# Patient Record
Sex: FEMALE | Race: WHITE | URBAN - METROPOLITAN AREA
[De-identification: names, ages, dates, MRNs, and addresses within clinical notes are randomized per-mention and may not be internally consistent; named-entity substitution may affect disease eponyms.]

---

## 2017-01-09 RX ORDER — DAPSONE 50 MG/G
GEL TOPICAL
Qty: 60 G | Refills: 0 | Status: SHIPPED | OUTPATIENT
Start: 2017-01-09 | End: 2017-03-03 | Stop reason: SDUPTHER

## 2017-01-20 ENCOUNTER — OFFICE VISIT (OUTPATIENT)
Dept: FAMILY MEDICINE CLINIC | Age: 22
End: 2017-01-20

## 2017-01-20 VITALS
RESPIRATION RATE: 16 BRPM | DIASTOLIC BLOOD PRESSURE: 74 MMHG | SYSTOLIC BLOOD PRESSURE: 114 MMHG | WEIGHT: 124 LBS | HEART RATE: 93 BPM | OXYGEN SATURATION: 98 % | HEIGHT: 63 IN | BODY MASS INDEX: 21.97 KG/M2 | TEMPERATURE: 97.6 F

## 2017-01-20 DIAGNOSIS — Z01.419 VISIT FOR GYNECOLOGIC EXAMINATION: ICD-10-CM

## 2017-01-20 DIAGNOSIS — Z00.00 WELL ADULT EXAM: Primary | ICD-10-CM

## 2017-01-20 PROCEDURE — 99395 PREV VISIT EST AGE 18-39: CPT | Performed by: FAMILY MEDICINE

## 2017-01-20 RX ORDER — LEVONORGESTREL / ETHINYL ESTRADIOL AND ETHINYL ESTRADIOL 150-30(84)
KIT ORAL
Qty: 91 TABLET | Refills: 3 | Status: SHIPPED | OUTPATIENT
Start: 2017-01-20

## 2017-01-20 ASSESSMENT — ENCOUNTER SYMPTOMS
NAUSEA: 0
SHORTNESS OF BREATH: 0
CHEST TIGHTNESS: 0
TROUBLE SWALLOWING: 0
WHEEZING: 0
EYE REDNESS: 0
RHINORRHEA: 0
EYE ITCHING: 0
ABDOMINAL PAIN: 0
VOMITING: 0

## 2017-03-06 RX ORDER — DAPSONE 50 MG/G
GEL TOPICAL
Qty: 60 G | Refills: 0 | Status: SHIPPED | OUTPATIENT
Start: 2017-03-06 | End: 2017-04-19 | Stop reason: SDUPTHER

## 2017-04-19 ENCOUNTER — OFFICE VISIT (OUTPATIENT)
Dept: FAMILY MEDICINE CLINIC | Age: 22
End: 2017-04-19

## 2017-04-19 VITALS
SYSTOLIC BLOOD PRESSURE: 118 MMHG | HEART RATE: 94 BPM | HEIGHT: 63 IN | BODY MASS INDEX: 22.15 KG/M2 | OXYGEN SATURATION: 99 % | DIASTOLIC BLOOD PRESSURE: 68 MMHG | WEIGHT: 125 LBS | TEMPERATURE: 97.1 F | RESPIRATION RATE: 16 BRPM

## 2017-04-19 DIAGNOSIS — N92.6 ABNORMAL MENSES: Primary | ICD-10-CM

## 2017-04-19 PROCEDURE — 99213 OFFICE O/P EST LOW 20 MIN: CPT | Performed by: FAMILY MEDICINE

## 2017-04-19 RX ORDER — DAPSONE 50 MG/G
GEL TOPICAL
Qty: 60 G | Refills: 0 | Status: SHIPPED | OUTPATIENT
Start: 2017-04-19 | End: 2017-06-16 | Stop reason: SDUPTHER

## 2017-04-19 RX ORDER — LEVONORGESTREL / ETHINYL ESTRADIOL AND ETHINYL ESTRADIOL 150-30(84)
KIT ORAL
Qty: 91 TABLET | Refills: 3 | Status: SHIPPED | OUTPATIENT
Start: 2017-04-19 | End: 2018-02-14 | Stop reason: SDUPTHER

## 2017-04-19 ASSESSMENT — ENCOUNTER SYMPTOMS
ABDOMINAL PAIN: 0
NAUSEA: 0
VISUAL CHANGE: 0
BACK PAIN: 0

## 2017-06-13 RX ORDER — DAPSONE 50 MG/G
GEL TOPICAL
Qty: 60 G | Refills: 0 | OUTPATIENT
Start: 2017-06-13

## 2017-06-14 ENCOUNTER — TELEPHONE (OUTPATIENT)
Dept: DERMATOLOGY | Age: 22
End: 2017-06-14

## 2017-06-16 ENCOUNTER — TELEPHONE (OUTPATIENT)
Dept: FAMILY MEDICINE CLINIC | Age: 22
End: 2017-06-16

## 2017-06-16 RX ORDER — DAPSONE 50 MG/G
GEL TOPICAL
Qty: 60 G | Refills: 0 | Status: SHIPPED | OUTPATIENT
Start: 2017-06-16 | End: 2017-07-17 | Stop reason: SDUPTHER

## 2017-07-18 RX ORDER — DAPSONE 50 MG/G
GEL TOPICAL
Qty: 60 G | Refills: 2 | Status: SHIPPED | OUTPATIENT
Start: 2017-07-18 | End: 2017-12-01 | Stop reason: SDUPTHER

## 2017-12-01 RX ORDER — DAPSONE 50 MG/G
GEL TOPICAL
Qty: 60 G | Refills: 2 | Status: SHIPPED | OUTPATIENT
Start: 2017-12-01 | End: 2018-03-15 | Stop reason: SDUPTHER

## 2018-02-14 ENCOUNTER — OFFICE VISIT (OUTPATIENT)
Dept: FAMILY MEDICINE CLINIC | Age: 23
End: 2018-02-14

## 2018-02-14 VITALS
HEART RATE: 99 BPM | TEMPERATURE: 96.5 F | BODY MASS INDEX: 21.97 KG/M2 | OXYGEN SATURATION: 99 % | RESPIRATION RATE: 16 BRPM | WEIGHT: 124 LBS | SYSTOLIC BLOOD PRESSURE: 130 MMHG | DIASTOLIC BLOOD PRESSURE: 82 MMHG | HEIGHT: 63 IN

## 2018-02-14 DIAGNOSIS — G47.00 INSOMNIA, UNSPECIFIED TYPE: ICD-10-CM

## 2018-02-14 DIAGNOSIS — R68.89 COLD INTOLERANCE: Primary | ICD-10-CM

## 2018-02-14 DIAGNOSIS — R53.82 CHRONIC FATIGUE: ICD-10-CM

## 2018-02-14 LAB
ANION GAP SERPL CALCULATED.3IONS-SCNC: 14 MMOL/L (ref 3–16)
BASOPHILS ABSOLUTE: 0 K/UL (ref 0–0.2)
BASOPHILS RELATIVE PERCENT: 0.5 %
BUN BLDV-MCNC: 11 MG/DL (ref 7–20)
CALCIUM SERPL-MCNC: 10.1 MG/DL (ref 8.3–10.6)
CHLORIDE BLD-SCNC: 102 MMOL/L (ref 99–110)
CO2: 25 MMOL/L (ref 21–32)
CREAT SERPL-MCNC: 0.7 MG/DL (ref 0.6–1.1)
EOSINOPHILS ABSOLUTE: 0.2 K/UL (ref 0–0.6)
EOSINOPHILS RELATIVE PERCENT: 2.1 %
GFR AFRICAN AMERICAN: >60
GFR NON-AFRICAN AMERICAN: >60
GLUCOSE BLD-MCNC: 84 MG/DL (ref 70–99)
HCT VFR BLD CALC: 43.9 % (ref 36–48)
HEMOGLOBIN: 14.9 G/DL (ref 12–16)
LYMPHOCYTES ABSOLUTE: 3 K/UL (ref 1–5.1)
LYMPHOCYTES RELATIVE PERCENT: 38.8 %
MCH RBC QN AUTO: 29.9 PG (ref 26–34)
MCHC RBC AUTO-ENTMCNC: 33.9 G/DL (ref 31–36)
MCV RBC AUTO: 88.2 FL (ref 80–100)
MONOCYTES ABSOLUTE: 0.6 K/UL (ref 0–1.3)
MONOCYTES RELATIVE PERCENT: 7.8 %
NEUTROPHILS ABSOLUTE: 3.9 K/UL (ref 1.7–7.7)
NEUTROPHILS RELATIVE PERCENT: 50.8 %
PDW BLD-RTO: 12.3 % (ref 12.4–15.4)
PLATELET # BLD: 297 K/UL (ref 135–450)
PMV BLD AUTO: 9.5 FL (ref 5–10.5)
POTASSIUM SERPL-SCNC: 4.1 MMOL/L (ref 3.5–5.1)
RBC # BLD: 4.98 M/UL (ref 4–5.2)
SODIUM BLD-SCNC: 141 MMOL/L (ref 136–145)
T4 FREE: 1.4 NG/DL (ref 0.9–1.8)
TSH SERPL DL<=0.05 MIU/L-ACNC: 2.33 UIU/ML (ref 0.27–4.2)
WBC # BLD: 7.6 K/UL (ref 4–11)

## 2018-02-14 PROCEDURE — 99214 OFFICE O/P EST MOD 30 MIN: CPT | Performed by: FAMILY MEDICINE

## 2018-02-14 RX ORDER — LEVONORGESTREL / ETHINYL ESTRADIOL AND ETHINYL ESTRADIOL 150-30(84)
KIT ORAL
Qty: 91 TABLET | Refills: 3 | Status: SHIPPED | OUTPATIENT
Start: 2018-02-14

## 2018-02-14 ASSESSMENT — ENCOUNTER SYMPTOMS
ABDOMINAL PAIN: 0
SORE THROAT: 0
EYE DISCHARGE: 0
VISUAL CHANGE: 0
VOMITING: 0
CHANGE IN BOWEL HABIT: 0
NAUSEA: 0
COUGH: 0
SWOLLEN GLANDS: 0

## 2018-02-14 NOTE — PROGRESS NOTES
Subjective:      Patient ID: Anne Dunlap is a 25 y.o. female. Other   This is a chronic (cold intolerence) problem. The current episode started more than 1 month ago. The problem occurs constantly. The problem has been unchanged. Associated symptoms include chills and fatigue. Pertinent negatives include no abdominal pain, anorexia, arthralgias, change in bowel habit, chest pain, congestion, coughing, diaphoresis, fever, headaches, joint swelling, myalgias, nausea, neck pain, numbness, rash, sore throat, swollen glands, vertigo, visual change, vomiting or weakness. Associated symptoms comments: Insomnia \"can't fall asleep because I'm cold\". Nothing aggravates the symptoms. She has tried nothing for the symptoms. Review of Systems   Constitutional: Positive for chills and fatigue. Negative for diaphoresis and fever. HENT: Negative for congestion and sore throat. Eyes: Negative for discharge. Respiratory: Negative for cough. Cardiovascular: Negative for chest pain. Gastrointestinal: Negative for abdominal pain, anorexia, change in bowel habit, nausea and vomiting. Endocrine: Positive for cold intolerance. Negative for heat intolerance. Musculoskeletal: Negative for arthralgias, joint swelling, myalgias and neck pain. Skin: Negative for rash. Neurological: Negative for dizziness, vertigo, weakness, light-headedness, numbness and headaches. Psychiatric/Behavioral: Positive for sleep disturbance. The patient is not nervous/anxious. has No Known Allergies. Current Outpatient Prescriptions:     Levonorgest-Eth Estrad 91-Day (SEASONIQUE) 0.15-0.03 &0.01 MG TABS, One po qd, Disp: 91 tablet, Rfl: 3    ACZONE 5 % GEL, APPLY TOPICALLY TO THE FACE TWICE A DAY FOR ACNE, Disp: 60 g, Rfl: 2    CAMRESE 0.15-0.03 &0.01 MG TABS, Take one po qd, Disp: 91 tablet, Rfl: 3     has a past medical history of GERD (gastroesophageal reflux disease).     Past Surgical History:   Procedure Laterality TSH without Reflex    T4, FREE    CBC Auto Differential   3. Insomnia, unspecified type             Plan:      1. Her exam is wnl  Check labs    2. Get fu labs    3.  Sleep hygiene, may try melatonin prn     Fu 1 mo with results

## 2018-02-16 ENCOUNTER — TELEPHONE (OUTPATIENT)
Dept: FAMILY MEDICINE CLINIC | Age: 23
End: 2018-02-16

## 2018-02-21 ENCOUNTER — OFFICE VISIT (OUTPATIENT)
Dept: FAMILY MEDICINE CLINIC | Age: 23
End: 2018-02-21

## 2018-02-21 VITALS
HEART RATE: 84 BPM | SYSTOLIC BLOOD PRESSURE: 120 MMHG | TEMPERATURE: 99 F | WEIGHT: 128 LBS | BODY MASS INDEX: 22.68 KG/M2 | HEIGHT: 63 IN | OXYGEN SATURATION: 99 % | DIASTOLIC BLOOD PRESSURE: 84 MMHG | RESPIRATION RATE: 16 BRPM

## 2018-02-21 DIAGNOSIS — Z01.419 ENCOUNTER FOR GYNECOLOGICAL EXAMINATION WITHOUT ABNORMAL FINDING: Primary | ICD-10-CM

## 2018-02-21 DIAGNOSIS — E55.9 VITAMIN D INSUFFICIENCY: ICD-10-CM

## 2018-02-21 DIAGNOSIS — Z23 NEED FOR VACCINATION: ICD-10-CM

## 2018-02-21 LAB — VITAMIN D 25-HYDROXY: 59.3 NG/ML

## 2018-02-21 PROCEDURE — 90715 TDAP VACCINE 7 YRS/> IM: CPT | Performed by: FAMILY MEDICINE

## 2018-02-21 PROCEDURE — 99395 PREV VISIT EST AGE 18-39: CPT | Performed by: FAMILY MEDICINE

## 2018-02-21 PROCEDURE — 90471 IMMUNIZATION ADMIN: CPT | Performed by: FAMILY MEDICINE

## 2018-02-21 ASSESSMENT — ENCOUNTER SYMPTOMS
WHEEZING: 0
EYE REDNESS: 0
EYE ITCHING: 0
TROUBLE SWALLOWING: 0
RHINORRHEA: 0
SHORTNESS OF BREATH: 0
CHEST TIGHTNESS: 0
NAUSEA: 0
ABDOMINAL PAIN: 0
VOMITING: 0

## 2018-02-21 NOTE — PROGRESS NOTES
present. No thyromegaly present. Cardiovascular: Normal rate, regular rhythm, normal heart sounds and intact distal pulses. Exam reveals no gallop and no friction rub. No murmur heard. Pulmonary/Chest: Effort normal and breath sounds normal. No stridor. No respiratory distress. She has no wheezes. She has no rales. Chest wall is not dull to percussion. She exhibits no mass, no tenderness, no bony tenderness, no laceration, no crepitus, no edema, no deformity, no swelling and no retraction. Right breast exhibits no inverted nipple, no mass, no nipple discharge, no skin change and no tenderness. Left breast exhibits no inverted nipple, no mass, no nipple discharge, no skin change and no tenderness. Breasts are symmetrical.   Abdominal: Soft. Bowel sounds are normal. She exhibits no distension and no mass. There is no tenderness. There is no rebound and no guarding. Hernia confirmed negative in the right inguinal area and confirmed negative in the left inguinal area. Genitourinary: Vagina normal and uterus normal. No labial fusion. There is no rash, tenderness, lesion or injury on the right labia. There is no rash, tenderness, lesion or injury on the left labia. Uterus is not deviated, not enlarged, not fixed and not tender. Cervix exhibits no motion tenderness, no discharge and no friability. Right adnexum displays no mass, no tenderness and no fullness. Left adnexum displays no mass, no tenderness and no fullness. No erythema, tenderness or bleeding in the vagina. No foreign body in the vagina. No signs of injury around the vagina. No vaginal discharge found. Musculoskeletal: She exhibits no edema or tenderness. Lymphadenopathy:     She has no cervical adenopathy. Right: No inguinal adenopathy present. Left: No inguinal adenopathy present. Neurological: She is alert and oriented to person, place, and time. She displays normal reflexes. No cranial nerve deficit.  She exhibits normal muscle

## 2018-02-22 ENCOUNTER — TELEPHONE (OUTPATIENT)
Dept: FAMILY MEDICINE CLINIC | Age: 23
End: 2018-02-22

## 2018-02-22 LAB
HIV AG/AB: NORMAL
HIV ANTIGEN: NORMAL
HIV-1 ANTIBODY: NORMAL
HIV-2 AB: NORMAL

## 2018-02-23 LAB
C. TRACHOMATIS DNA,THIN PREP: NEGATIVE
N. GONORRHOEAE DNA, THIN PREP: NEGATIVE

## 2018-03-16 RX ORDER — DAPSONE 50 MG/G
GEL TOPICAL
Qty: 60 G | Refills: 2 | Status: SHIPPED | OUTPATIENT
Start: 2018-03-16 | End: 2018-07-17 | Stop reason: SDUPTHER

## 2018-04-04 ENCOUNTER — OFFICE VISIT (OUTPATIENT)
Dept: FAMILY MEDICINE CLINIC | Age: 23
End: 2018-04-04

## 2018-04-04 VITALS
DIASTOLIC BLOOD PRESSURE: 78 MMHG | OXYGEN SATURATION: 98 % | BODY MASS INDEX: 22.68 KG/M2 | SYSTOLIC BLOOD PRESSURE: 122 MMHG | HEART RATE: 85 BPM | RESPIRATION RATE: 16 BRPM | HEIGHT: 63 IN | TEMPERATURE: 97.6 F | WEIGHT: 128 LBS

## 2018-04-04 DIAGNOSIS — J40 BRONCHITIS: Primary | ICD-10-CM

## 2018-04-04 PROCEDURE — 99214 OFFICE O/P EST MOD 30 MIN: CPT | Performed by: FAMILY MEDICINE

## 2018-04-04 RX ORDER — AZITHROMYCIN 250 MG/1
TABLET, FILM COATED ORAL
Qty: 1 PACKET | Refills: 0 | Status: SHIPPED | OUTPATIENT
Start: 2018-04-04 | End: 2018-04-14

## 2018-04-04 RX ORDER — BENZONATATE 100 MG/1
100 CAPSULE ORAL 3 TIMES DAILY PRN
Qty: 21 CAPSULE | Refills: 0 | Status: SHIPPED | OUTPATIENT
Start: 2018-04-04 | End: 2018-04-11

## 2018-04-05 ASSESSMENT — ENCOUNTER SYMPTOMS
RHINORRHEA: 0
WHEEZING: 0
CHEST TIGHTNESS: 0
HEARTBURN: 0
STRIDOR: 0
NAUSEA: 0
EYE ITCHING: 0
HEMOPTYSIS: 0
VOMITING: 0
EYE DISCHARGE: 0
SORE THROAT: 0
DIARRHEA: 0
SHORTNESS OF BREATH: 1
BACK PAIN: 0
COUGH: 1

## 2018-07-19 RX ORDER — DAPSONE 50 MG/G
GEL TOPICAL
Qty: 60 G | Refills: 2 | Status: SHIPPED | OUTPATIENT
Start: 2018-07-19 | End: 2018-11-04 | Stop reason: SDUPTHER

## 2018-09-27 PROBLEM — Z01.419 VISIT FOR GYNECOLOGIC EXAMINATION: Status: RESOLVED | Noted: 2017-01-20 | Resolved: 2018-09-27

## 2018-11-05 RX ORDER — DAPSONE 50 MG/G
GEL TOPICAL
Qty: 60 G | Refills: 2 | Status: SHIPPED | OUTPATIENT
Start: 2018-11-05 | End: 2019-04-24 | Stop reason: SDUPTHER

## 2019-02-28 ENCOUNTER — TELEPHONE (OUTPATIENT)
Dept: FAMILY MEDICINE CLINIC | Age: 24
End: 2019-02-28

## 2019-03-15 RX ORDER — LEVONORGESTREL AND ETHINYL ESTRADIOL 0.15-0.03
KIT ORAL
Qty: 91 TABLET | Refills: 3 | Status: SHIPPED | OUTPATIENT
Start: 2019-03-15

## 2019-04-24 RX ORDER — DAPSONE 50 MG/G
GEL TOPICAL
Qty: 60 G | Refills: 2 | Status: SHIPPED | OUTPATIENT
Start: 2019-04-24 | End: 2019-04-27 | Stop reason: SDUPTHER

## 2019-04-29 RX ORDER — DAPSONE 50 MG/G
GEL TOPICAL
Qty: 60 G | Refills: 0 | Status: SHIPPED | OUTPATIENT
Start: 2019-04-29

## 2019-09-19 ENCOUNTER — TELEPHONE (OUTPATIENT)
Dept: FAMILY MEDICINE CLINIC | Age: 24
End: 2019-09-19

## 2019-09-19 RX ORDER — DAPSONE 50 MG/G
GEL TOPICAL
Qty: 60 G | Refills: 0 | OUTPATIENT
Start: 2019-09-19

## 2019-09-27 RX ORDER — DAPSONE 50 MG/G
GEL TOPICAL
Qty: 90 G | Refills: 0 | OUTPATIENT
Start: 2019-09-27

## 2019-11-14 ENCOUNTER — TELEPHONE (OUTPATIENT)
Dept: OTHER | Facility: OUTPATIENT CENTER | Age: 24
End: 2019-11-14

## 2019-11-14 NOTE — TELEPHONE ENCOUNTER
Telephone Intake--REST  Date: 2019  Client Name: Alexa    MRN: [id number]  : 1995     Age: 24  Caller Name: Self      Presenting Problem / Reason for Assessment   (Clinical History &Symptoms):     Little to no sexual desire.  Has not been sexually active recently.  Looking to start a new relationship  She states that her decreased desire seemed to co inside with starting oral contraceptives 2-3 yrs ago       Length of time experiencing symptoms: 2 - 3 yrs      Seen Other Providers for Gender (if so, where):    M.D/other.(hormones) : [facility]  --If yes, request records from the provider at the 1st session.    Therapist:  N/A  --if yes, request a referral or summary letter from the therapist at the 1st session..    Psychiatrist: N/A  --if yes,, request records from the provider at the 1st session..      Other Medical/Mental Health Diagnoses:  Female Sexual Dysfunction      Medications:  Birth Control        Primary Care Provider:  No  --If multiple medical conditions, request recent records from primary doctor at the 1st session..      Referral Source:       Follow Up:        Please Verify Registration    If patient has GlySure or Chatty, send to .     Prep Welcome Packet and have patient come half hour beforehand to fill out forms in packet (health history form, transgender history, Safety Screening, PHQ9, and PERFECTO-7.       Paperwork sent   Appt

## 2020-01-07 ENCOUNTER — OFFICE VISIT (OUTPATIENT)
Dept: OTHER | Facility: OUTPATIENT CENTER | Age: 25
End: 2020-01-07
Payer: COMMERCIAL

## 2020-01-07 DIAGNOSIS — R68.82 LOW LIBIDO: ICD-10-CM

## 2020-01-07 DIAGNOSIS — F41.9 ANXIETY: ICD-10-CM

## 2020-02-11 PROBLEM — F41.9 ANXIETY: Status: ACTIVE | Noted: 2020-02-11

## 2020-02-11 PROBLEM — R68.82 LOW LIBIDO: Status: ACTIVE | Noted: 2020-02-11

## 2020-02-11 NOTE — PROGRESS NOTES
SEXUAL MEDICINE EVALUATION      IDENTIFICATION:  A 24-year-old female.      CHIEF CONCERN:  Decreased libido x1-2 years.      HISTORY OF PRESENT ILLNESS:  The patient first started her current relationship about 4 years ago with a male partner.  She wanted to wait on engaging in intercourse, and she is currently with the same partner.  She has had no prior sexual partners.  They engaged in non-intercourse sexual activity, touching and foreplay.  No oral sex.  No other penetrative type of sexual activities.  At this time she felt sexual thoughts, feelings and interest.  She felt pleasure from their sexual activity and enjoyment.  Approximately 2 years into their relationship, she felt comfortable enough to engage in intercourse.  He was her first sexual partner.  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  Had experienced test anxiety in college.  Started her first job.  She notes that she continues to get very nervous when quizzed or questioned.  Feels a low level of baseline anxiety.  Prior to all of this, school had been going less well for her as she moved into her first job.  Around this time, also, they had delayed intercourse because she did not want to engage in something that she did not want to do at the time.  There was some relationship stress over it.        After beginning her sexual relationship that involved intercourse, she noticed a gradual decrease in her libido.  Initially, it was difficult to notice, but now over time has become very obvious over the last 2 years.  She describes now few or no sexual thoughts or feelings.  It is better if she appears to be having some breakthrough bleeding on her birth control pills.  It is better when she is not feeling anxious or stressed.  It is better when she has been reading or engaging in some romantic or sexually related book or movie.  There is no change with vacations, no change with more intimacy or time with partner.  She does not drink.  They  still engage in some kissing.  She will sometimes try masturbation.  She achieves some subjective pleasure from this.  Feels longer to get to any objective arousal.  She is able to reach orgasm.  She experiences no pain with sexual activity.      She started birth control pills for acne on her face about 3-1/2 years ago.  She initially started monthly cyclical birth control pills and then switched to every 12 week continuous contraception.  Last fall changed her version from Seasonale or Seasonique to a monophasic pill taken continuously with a period every 12 weeks.  Prior to using birth control pills, her periods were regular with mild to moderate flow and no significant cramping.      The patient denies any history of sexual abuse or violence.      CURRENT MEDICAL PROBLEMS:  Acne as noted above, anxiety.      ALLERGIES:  No known drug allergies.      MEDICATIONS:     1.  Dapsone 5% gel to face.     2.  Vitamin D.     3.  Multivitamin    4.  Birth control pills as noted above.  Currently on Loestrin 1/20 with iron.      PAST MEDICAL HISTORY:  No hospitalizations or surgeries.      FAMILY HISTORY:  Mother with diabetes, type 1.  Father with cardiac valve disease, depression, stroke, obesity.  Maternal grandmother with type 2 diabetes.  Great aunt with breast cancer, nongenetic, as she was tested.  Maternal aunt with leukemia.  Maternal uncle with heart disease.  Maternal grandfather with MI.  Paternal grandfather with MI.  Paternal grandmother with breast cancer.      SOCIAL HISTORY:  The patient eats a standard diet, including dairy.  Has never smoked.  Alcohol once a month, 1 per sitting.  No recreational substances.  Is active 3-4 times a week with a barre class.  Works as a .  No children.      SEXUAL HISTORY:  Has had 1 partner in the lifetime, male partner.  Is hepatitis B vaccinated, HPV vaccinated.  No history of STIs.  Paps have been normal, last one in 2019.  HIV tested negative.       REVIEW OF SYSTEMS:  Notable for nausea with anxiety, occasional neck stiffness and notable anxiety as discussed above.  PHQ-9 is 6, notable for sleep disturbance and lack of energy.  Gad7 is 9.        PHYSICAL EXAMINATION:   GENERAL:  The patient is alert, in no apparent distress.   VITAL SIGNS:  Height 5 feet 3 inches, blood pressure 138/92, pulse 84, weight 138.3.     MENTAL STATUS:  Speech regular rate and rhythm.  Mood appears quite anxious with mild psychomotor agitation.  Is somewhat tearful during the interview.  No suicidal thoughts or ideation apparent.  Affect congruent with mood.     HEENT:  Pupils equal and reactive to light.   SKIN:  No lesions or rashes.   NECK:  Supple, no thyroid enlargement, no carotid bruits, no adenopathy.   HEART:  S1 and S2, no murmurs.   LUNGS:  Clear to auscultation bilaterally.   EXTREMITIES:  Positive pedal pulses bilaterally.  No edema.        LABORATORY:  Reviewed previous labs by previous providers in 12/2019.  TSH, vitamin D and complete blood count were all normal.      ASSESSMENT:   1.  Low libido.   2.  Anxiety disorder, NOS.        PLAN:     1.  Discussed the nature and treatment of low libido, including biological, psychological and social/life factors.  Physiologic components to the patient's change in desire may be related to oral contraceptive pills.  The patient has no other chronic medical problems or chronic medications or acute medical changes that could be affecting her libido at this point.  Would recommend a trial off birth control pills as long as she is not experiencing any significant anxiety regarding pregnancy, and her acne remains reasonably controlled on dapsone gel during a 3 month trial off.  Psychological components include significant anxiety noted above.  Discussed options for managing anxiety, including ongoing therapy as well as medication interventions.  The patient declines all medications, all SSRIs or SNRIs.  Discussed  non-alternative medications, including buspirone and propranolol.  The patient will consider these options.  Life and communication events:  Discussed the communication and events surrounding the beginning of sexual intercourse, anxiety going through engineering school and starting her first job as well as other stress on their relationship over sexual issues.  Recommend sex therapy as well as general therapy for addressing sexual communication and adapting to any sexual concerns or desire discrepancy that may exist between them.     2.  Discussed other medical interventions that may be available for treating low desire, including Wellbutrin, Addyi and Vyleesi.  The patient is not a good candidate for Wellbutrin therapy due to high levels of anxiety.  Discussed flibanserin/Addyi and Vyleesi as therapeutic options, their risks and benefits.  The patient declines both of these.  To follow up as needed if interested in any of the interventions discussed above and again recommended sex therapy and continuing general therapy.       .

## 2020-03-12 ASSESSMENT — ANXIETY QUESTIONNAIRES
7. FEELING AFRAID AS IF SOMETHING AWFUL MIGHT HAPPEN: NOT AT ALL
GAD7 TOTAL SCORE: 7
6. BECOMING EASILY ANNOYED OR IRRITABLE: NOT AT ALL
2. NOT BEING ABLE TO STOP OR CONTROL WORRYING: SEVERAL DAYS
5. BEING SO RESTLESS THAT IT IS HARD TO SIT STILL: NOT AT ALL
1. FEELING NERVOUS, ANXIOUS, OR ON EDGE: MORE THAN HALF THE DAYS
3. WORRYING TOO MUCH ABOUT DIFFERENT THINGS: MORE THAN HALF THE DAYS

## 2020-03-12 ASSESSMENT — PATIENT HEALTH QUESTIONNAIRE - PHQ9
5. POOR APPETITE OR OVEREATING: MORE THAN HALF THE DAYS
SUM OF ALL RESPONSES TO PHQ QUESTIONS 1-9: 6

## 2020-03-13 ASSESSMENT — ANXIETY QUESTIONNAIRES: GAD7 TOTAL SCORE: 7

## 2023-09-01 ENCOUNTER — TRANSFERRED RECORDS (OUTPATIENT)
Dept: HEALTH INFORMATION MANAGEMENT | Facility: CLINIC | Age: 28
End: 2023-09-01

## 2023-09-01 LAB
CHOLESTEROL (EXTERNAL): 170 MG/DL
HDLC SERPL-MCNC: 53 MG/DL
LDL CHOLESTEROL CALCULATED (EXTERNAL): 91 MG/DL
NON HDL CHOLESTEROL (EXTERNAL): 117 MG/DL
TRIGLYCERIDES (EXTERNAL): 151 MG/DL

## 2023-09-05 ENCOUNTER — TRANSCRIBE ORDERS (OUTPATIENT)
Dept: OTHER | Age: 28
End: 2023-09-05

## 2023-09-05 DIAGNOSIS — Z82.49 FAMILY HISTORY OF ISCHEMIC HEART DISEASE: Primary | ICD-10-CM

## 2023-10-09 NOTE — TELEPHONE ENCOUNTER
RECORDS RECEIVED FROM:    DATE RECEIVED:    NOTES STATUS DETAILS   OFFICE NOTE from referring provider  In process    OFFICE NOTE from other cardiologists  N/A    RECORDS from hospital/ED N/A    MEDICATION LIST Internal    GENERAL CARDIO RECORDS   (ALL APPOINTMENT TYPES)     LABS (CBC,BMP,CMP, TSH) Care Everywhere    EKG (STRIPS & REPORTS) N/A    MONITORS (STRIPS & REPORTS) N/A    ECHOS (IMAGES AND REPORTS) N/A    STRESS TESTS (IMAGES AND REPORTS) N/A    cMRI (IMAGES AND REPORTS) N/A    CT/CTA (IMAGES AND REPORTS) N/A      Action 10/9/23 [facility]  Fax #[phone]   Action Taken Requested office notes from referral    Sent note and cholesterol labs to scanning 10/18

## 2023-10-29 ENCOUNTER — HEALTH MAINTENANCE LETTER (OUTPATIENT)
Age: 28
End: 2023-10-29

## 2023-10-30 ENCOUNTER — OFFICE VISIT (OUTPATIENT)
Dept: CARDIOLOGY | Facility: CLINIC | Age: 28
End: 2023-10-30
Attending: NURSE PRACTITIONER
Payer: COMMERCIAL

## 2023-10-30 ENCOUNTER — PRE VISIT (OUTPATIENT)
Dept: CARDIOLOGY | Facility: CLINIC | Age: 28
End: 2023-10-30
Payer: COMMERCIAL

## 2023-10-30 VITALS
HEART RATE: 80 BPM | WEIGHT: 157 LBS | DIASTOLIC BLOOD PRESSURE: 81 MMHG | OXYGEN SATURATION: 98 % | BODY MASS INDEX: 27.82 KG/M2 | HEIGHT: 63 IN | SYSTOLIC BLOOD PRESSURE: 119 MMHG

## 2023-10-30 DIAGNOSIS — Z82.79 FAMILY HISTORY OF BICUSPID AORTIC VALVE: Primary | ICD-10-CM

## 2023-10-30 DIAGNOSIS — R53.83 OTHER FATIGUE: ICD-10-CM

## 2023-10-30 PROCEDURE — 99203 OFFICE O/P NEW LOW 30 MIN: CPT | Performed by: INTERNAL MEDICINE

## 2023-10-30 PROCEDURE — 99213 OFFICE O/P EST LOW 20 MIN: CPT | Performed by: INTERNAL MEDICINE

## 2023-10-30 RX ORDER — LORATADINE 10 MG/1
TABLET ORAL
COMMUNITY

## 2023-10-30 RX ORDER — DAPSONE 50 MG/G
1 GEL TOPICAL
COMMUNITY
Start: 2023-08-29

## 2023-10-30 RX ORDER — DAPSONE 75 MG/G
GEL TOPICAL DAILY
COMMUNITY

## 2023-10-30 RX ORDER — CHOLECALCIFEROL (VITAMIN D3) 125 MCG
CAPSULE ORAL
COMMUNITY

## 2023-10-30 RX ORDER — FLUTICASONE PROPIONATE 50 MCG
SPRAY, SUSPENSION (ML) NASAL
COMMUNITY
Start: 2023-01-12

## 2023-10-30 ASSESSMENT — PAIN SCALES - GENERAL: PAINLEVEL: NO PAIN (0)

## 2023-10-30 NOTE — PROGRESS NOTES
[facility]      Referring provider:Jaja Pulliam NP     HPI: Ms. Alexa Tucker is a 28 year old  female with PMH significant for  -Family history of bicuspid aortic valve    Patient was recently seen at [facility] and patient reported her father undergoing heart valve replacement due to a genetic issue.  She is referred to cardiology based on family history.  Patient tells me that her father was found to have abnormal heart valve (she confirmed with her mother that it was bicuspid aortic valve) when he was born and underwent several valve surgeries throughout his life.  Uncle also had valve replacement for bicuspid aortic valve.  Her cousin probably has heart issues.  Patient has no prior history of cardiac disease. She tells me that she always felt fatigued.  No clear cause identified so far.  TSH and CBC were normal 2 years ago.  Patient tells me that her symptoms particularly fatigue has not changed over the last years. Otherwise denies chest pain, shortness of breath, lower extremity edema.  She does not smoke.  No alcohol abuse.  She works as an  for [employer].   I reviewed patient's recent cholesterol test results from [facility] which shows normal cholesterol level.      She is wondering if she has sleep apnea.  She needs 12-hour of sleep to feel good.    Medications, personal, family, and social history reviewed with patient and revised.    PAST MEDICAL HISTORY:  No past medical history on file.    CURRENT MEDICATIONS:  No current outpatient medications on file.       PAST SURGICAL HISTORY:  No past surgical history on file.    ALLERGIES:   Not on File    FAMILY HISTORY:  No family history on file.      SOCIAL HISTORY:       ROS:   Constitutional: No fever, chills, or sweats. Weight stable.   Cardiovascular: As  "per HPI.       Exam:  /81 (BP Location: Right arm, Patient Position: Sitting, Cuff Size: Adult Regular)   Pulse 80   Ht 1.61 m (5' 3.39\")   Wt 71.2 kg (157 lb)   SpO2 98%   BMI 27.47 kg/m    GENERAL APPEARANCE: alert and no distress  HEENT: no icterus, no central cyanosis  LYMPH/NECK: no adenopathy, no asymmetry, JVP not elevated, no carotid bruits.  RESPIRATORY: lungs clear to auscultation - no rales, rhonchi or wheezes, no use of accessory muscles, no retractions, respirations are unlabored, normal respiratory rate  CARDIOVASCULAR: regular rhythm, normal S1, S2, no S3 or S4 and no murmur, click or rub, precordium quiet with normal PMI.  GI: soft, non tender  EXTREMITIES: peripheral pulses normal, no edema  NEURO: alert, normal speech,and affect  SKIN: no ecchymoses, no rashes     I have reviewed the labs and personally reviewed the imaging below and made my comment in the assessment and plan.    Labs:  CBC RESULTS:   No results found for: \"WBC\", \"RBC\", \"HGB\", \"HCT\", \"MCV\", \"MCH\", \"MCHC\", \"RDW\", \"PLT\"    BMP RESULTS:  No results found for: \"NA\", \"POTASSIUM\", \"CHLORIDE\", \"CO2\", \"ANIONGAP\", \"GLC\", \"BUN\", \"CR\", \"GFRESTIMATED\", \"GFRESTBLACK\", \"CANDACE\"     12/9/2020 blood test from [facility]: TSH is normal at 1.47.,  CBC shows normal hemoglobin at 15.4          Echocardiogram  No results found for this or any previous visit (from the past 8760 hour(s)).      Assessment and Plan:     #Family history of bicuspid aortic valve (father and uncle underwent aortic valve replacement)  -Physical exam is normal.  No cardiac murmur.  -Recommend transthoracic echocardiogram to assess aortic valve    # Long history of fatigue and her requirement for prolonged hours of sleep  -Sleep study to rule out sleep apnea    Return to clinic as needed.      Total time spent today for this visit is 43 minutes including precharting, face-to-face clinic visit, review of labs/imaging and medical documentation.    Keyshawn HERRMANN, " MD  [facility] Division of Cardiology  Pager [phone]

## 2023-10-30 NOTE — PATIENT INSTRUCTIONS
Complete an echocardiogram (ultrasound of heart)     Referral placed for sleep study.  They will call you to schedule.  If you don't hear from a representative within 2 business days, please call [phone].    Follow up based on results.     If you gather more information regarding your family cardiac history, please send one message with all information once gathered to Dr. Sow for review.

## 2023-11-13 ENCOUNTER — ANCILLARY PROCEDURE (OUTPATIENT)
Dept: CARDIOLOGY | Facility: CLINIC | Age: 28
End: 2023-11-13
Attending: INTERNAL MEDICINE
Payer: COMMERCIAL

## 2023-11-13 DIAGNOSIS — Z82.79 FAMILY HISTORY OF BICUSPID AORTIC VALVE: ICD-10-CM

## 2023-11-13 LAB — LVEF ECHO: NORMAL

## 2023-11-13 PROCEDURE — 93306 TTE W/DOPPLER COMPLETE: CPT | Performed by: INTERNAL MEDICINE

## 2024-12-21 ENCOUNTER — HEALTH MAINTENANCE LETTER (OUTPATIENT)
Age: 29
End: 2024-12-21